# Patient Record
Sex: FEMALE | Race: WHITE | NOT HISPANIC OR LATINO | URBAN - NONMETROPOLITAN AREA
[De-identification: names, ages, dates, MRNs, and addresses within clinical notes are randomized per-mention and may not be internally consistent; named-entity substitution may affect disease eponyms.]

---

## 2017-05-15 PROBLEM — Z96.1: Noted: 2017-02-13

## 2017-05-15 PROBLEM — H52.4: Noted: 2019-02-18

## 2017-05-15 PROBLEM — H40.1131: Noted: 2017-05-15

## 2017-05-15 PROBLEM — H43.813: Noted: 2017-05-15

## 2019-02-18 ENCOUNTER — IMPORTED ENCOUNTER (OUTPATIENT)
Dept: URBAN - NONMETROPOLITAN AREA CLINIC 1 | Facility: CLINIC | Age: 84
End: 2019-02-18

## 2019-02-18 PROCEDURE — 99214 OFFICE O/P EST MOD 30 MIN: CPT

## 2019-02-18 PROCEDURE — 92250 FUNDUS PHOTOGRAPHY W/I&R: CPT

## 2019-02-18 NOTE — PATIENT DISCUSSION
POAG OUDiscussed diagnosis with patient. 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 Importance of good compliance with medications was emphasize. Optos performed today; stable OU. IOP stable at 13 OD 14 OSCup to Dic noted at 0.75 OD 0.8 OS Continue Latanoprost QHS OU. Continue to monitor. RTC in 3 months with VF 24-2PVD OU Discussed findings of exam in detail with the patient. The risk of retinal detachment in patients with PVDs was discussed with the patient and the warning signs of retinal detachment were carefully reviewed with the patient. The patient was warned to return to the office or contact the ophthalmologist on call immediately if they experience signs of retinal detachment. Continue to monitor. Pseudophakia OUDiscussed diagnosis in detail with patient. Both intraocular lenses in place today and stable. Conitnue to monitor. Presbyopia OUDiscussed refractive status in detail with patient. New glasses Rx given today. Continue to monitor.; 's Notes: MR 7/1/16DFE 8/20/18OCT 8/20/18Gonio 11/19/18VF 5/21/18Optos 2/18/19

## 2019-06-28 ENCOUNTER — IMPORTED ENCOUNTER (OUTPATIENT)
Dept: URBAN - NONMETROPOLITAN AREA CLINIC 1 | Facility: CLINIC | Age: 84
End: 2019-06-28

## 2019-06-28 PROCEDURE — 99214 OFFICE O/P EST MOD 30 MIN: CPT

## 2019-06-28 PROCEDURE — 92083 EXTENDED VISUAL FIELD XM: CPT

## 2019-06-28 NOTE — PATIENT DISCUSSION
POAG OUDiscussed diagnosis with patient. 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 Importance of good compliance with medications was emphasize. VF done today; rim artifact OD and Superior/inferior arcuate OS. IOP stable at 12 OU. Cup to Dic noted at 0.75 OD 0.8 OS Continue Latanoprost QHS OU. Continue to monitor. RTC in 3 months with OCTPVD OU Discussed findings of exam in detail with the patient. The risk of retinal detachment in patients with PVDs was discussed with the patient and the warning signs of retinal detachment were carefully reviewed with the patient. The patient was warned to return to the office or contact the ophthalmologist on call immediately if they experience signs of retinal detachment. Continue to monitor. Pseudophakia OUDiscussed diagnosis in detail with patient. Both intraocular lenses in place today and stable. Conitnue to monitor. Presbyopia OUDiscussed refractive status in detail with patient. Continue to monitor.; 's Notes: MR 2/18/19DFE 6/28/19OCT 8/20/18Gonio 11/19/18VF 6/28/19Optos 2/18/19

## 2019-10-11 ENCOUNTER — IMPORTED ENCOUNTER (OUTPATIENT)
Dept: URBAN - NONMETROPOLITAN AREA CLINIC 1 | Facility: CLINIC | Age: 84
End: 2019-10-11

## 2019-10-11 PROCEDURE — 99214 OFFICE O/P EST MOD 30 MIN: CPT

## 2019-10-11 PROCEDURE — 92133 CPTRZD OPH DX IMG PST SGM ON: CPT

## 2019-10-11 NOTE — PATIENT DISCUSSION
POAG OUDiscussed diagnosis with patient. 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 Importance of good compliance with medications was emphasize. VF done previously; rim artifact OD and Superior/inferior arcuate OS. IOP stable at 14 OU Cup to Dic noted at 0.75 OD 0.8 OS Continue Latanoprost QHS OU. OCT done today shows NFL OD and Superior Inferior and Temporal thinning OS stable from previous Continue to monitor. RTC in 3 months with OPTOS PVD OU Discussed findings of exam in detail with the patient. The risk of retinal detachment in patients with PVDs was discussed with the patient and the warning signs of retinal detachment were carefully reviewed with the patient. The patient was warned to return to the office or contact the ophthalmologist on call immediately if they experience signs of retinal detachment. Continue to monitor. Pseudophakia OUDiscussed diagnosis in detail with patient. Both intraocular lenses in place today and stable. Conitnue to monitor. Presbyopia OUDiscussed refractive status in detail with patient. Continue to monitor.; 's Notes: MR 2/18/19DFE 6/28/19OCT 10/11/19 Gonio 11/19/18VF 6/28/19Optos 2/18/19

## 2019-10-24 ENCOUNTER — IMPORTED ENCOUNTER (OUTPATIENT)
Dept: URBAN - NONMETROPOLITAN AREA CLINIC 1 | Facility: CLINIC | Age: 84
End: 2019-10-24

## 2019-10-24 PROCEDURE — 99214 OFFICE O/P EST MOD 30 MIN: CPT

## 2019-10-24 PROCEDURE — 92250 FUNDUS PHOTOGRAPHY W/I&R: CPT

## 2019-10-24 PROCEDURE — 92020 GONIOSCOPY: CPT

## 2019-10-24 NOTE — PATIENT DISCUSSION
POAG OU-  Discussed diagnosis with patient. -  Discussed the chronic progressive nature of this disease and various treatment options. -  Importance of good compliance with medications was emphasize patient reports compliance with Latanoprost OU last night.-  Patient recently started Lasix ?mg QD started last week (10/16/19) and patient started noticing decreased VA OD on Saturday (10/19/19) as well as slight fever. She was also on an antibiotic during this time due to UTI started on Macrobid. -  IOP done by tech today 38 OD 52 OS-  IOP checked by me today at 28 OD 46 OS-  1 drop of Betimol 0.5% instilled in office at 4:11pm again at 4:23pm. -  IOP checked again at 4:40pm at 26 OD and 42 OS instilled another drop of Betimolol 0.5% at 4:41pm. -  IOP checked again at 5:11pm at 32 OD and 46 OS instilled another drop of Betimol 0.5% at 5:12pm. -  IOP checked again at 5:38pm at OD 18 and OS 42. -  IOP checked again at 5:47pm at OS 36.-  Gonio done today grade IV with light pigment OD and grade II with darker pigment OS. (OS worse than prior of grade IV). -  Ordered Optos and obtained today:  Appears stable OU.-  Patient denies any pain over the last two weeks in either eye. -  VF done previously; rim artifact OD and Superior/inferior arcuate OS.-  Cup to Dic noted at 0.75 OD 0.8 OS (stable OU)-  No hypopion or vitreous cells noted-  Continue Latanoprost OU QHS refills sent-  Start Azopt (Dorzolamide) OS TID Rx sent -  Start Lotemax SM OS BID sample given-  Start Combigan OS BID sample given-  Start Besivance OS QID sample given-  OCT done last visit: shows NFL OD and Superior Inferior and Temporal thinning OS stable from previous -  Continue to monitor.---------------------------------------------------------------PVD OU Discussed findings of exam in detail with the patient. The risk of retinal detachment in patients with PVDs was discussed with the patient and the warning signs of retinal detachment were carefully reviewed with the patient. The patient was warned to return to the office or contact the ophthalmologist on call immediately if they experience signs of retinal detachment. Continue to monitor. Pseudophakia OUDiscussed diagnosis in detail with patient. Both intraocular lenses in place today and stable. Conitnue to monitor. Presbyopia OUDiscussed refractive status in detail with patient. Continue to monitor.; 's Notes: MR 2/18/19DFE 6/28/19OCT 10/11/19 Gonio  10/24/19 (OD gIV OS gII)VF 6/28/19Optos 10/24/19

## 2019-10-25 ENCOUNTER — IMPORTED ENCOUNTER (OUTPATIENT)
Dept: URBAN - NONMETROPOLITAN AREA CLINIC 1 | Facility: CLINIC | Age: 84
End: 2019-10-25

## 2019-10-25 PROCEDURE — 99213 OFFICE O/P EST LOW 20 MIN: CPT

## 2019-10-25 NOTE — PATIENT DISCUSSION
POAG OU w/ Excessive IOP spike last 24 hour (improved today)-  Discussed diagnosis with patient. -  Discussed the chronic progressive nature of this disease and various treatment options. -  Importance of good compliance with medications was emphasize patient reports compliance with Latanoprost OU last night.-  Patient recently started Lasix ?mg QD started last week (10/16/19) and patient started noticing decreased VA OD on Saturday (10/19/19) as well as slight fever. She was also on an antibiotic during this time due to UTI started on Macrobid. -  IOP done by tech today 38 OD 52 OS 10/24/19-  IOP checked by me today at 28 OD 46 OS 10/24/19-  1 drop of Betimol 0.5% instilled in office at 4:11pm again at 4:23pm. -  IOP checked again at 4:40pm at 26 OD and 42 OS instilled another drop of Betimolol 0.5% at 4:41pm.  10/24/19-  IOP checked again at 5:11pm at 32 OD and 46 OS instilled another drop of Betimol 0.5% at 5:12pm. 10/24/19-  IOP checked again at 5:38pm at OD 18 and OS 42. 10/24/19-  IOP checked again at 5:47pm at OS 36. 10/24/19-IOP much better today OD @ 21 and OS 26 -  Gonio done 10/24/19  grade IV with light pigment OD and grade II with      darker pigment OS. (OS worse than prior of grade IV). Gonio done again today   OS  only improved Grade III with light pigment. -  Ordered Optos and obtained at last exam :  Appears stable OU.-  Patient denies any pain over the last two weeks in either eye. -  VF done previously; rim artifact OD and Superior/inferior arcuate OS.-  Cup to Dic noted at 0.75 OD 0.8 OS (stable OU)-  No hypopion or vitreous cells noted-  Continue Latanoprost OU QHS refills sent yesterday 10/24/19-  Continue Azopt (Dorzolamide) OU BID Rx sent 10/24/19 -  Continue Lotemax SM OS BID sample given 10/24/19-  Continue Combigan OS QD sample given 10/24/19 (pt experiencing burning)-  Continue Besivance OS QID sample given 10/24/19-  OCT done last visit: shows NFL OD and Superior Inferior and Temporal thinning OS stable from previous -Will have patient come in on Tuesday to see me and check IOP again will discuss patient hx with Dr. Sofie Trammell and have her see him on Wednesday unless he states otherwise. Will schedule appt for her on Wed with him and let her know if he does not need to see her. -  Continue to monitor.---------------------------------------------------------------PVD OU Discussed findings of exam in detail with the patient. The risk of retinal detachment in patients with PVDs was discussed with the patient and the warning signs of retinal detachment were carefully reviewed with the patient. The patient was warned to return to the office or contact the ophthalmologist on call immediately if they experience signs of retinal detachment. Continue to monitor. Pseudophakia OUDiscussed diagnosis in detail with patient. Both intraocular lenses in place today and stable. Conitnue to monitor. Presbyopia OUDiscussed refractive status in detail with patient. Continue to monitor.; Dr's Notes: MR 2/18/19DFE 6/28/19OCT 10/11/19 Gonio  10/24/19 (OD gIV OS gII)VF 6/28/19Optos 10/24/19

## 2019-10-29 ENCOUNTER — IMPORTED ENCOUNTER (OUTPATIENT)
Dept: URBAN - NONMETROPOLITAN AREA CLINIC 1 | Facility: CLINIC | Age: 84
End: 2019-10-29

## 2019-10-29 PROCEDURE — 99213 OFFICE O/P EST LOW 20 MIN: CPT

## 2019-10-29 NOTE — PATIENT DISCUSSION
POAG OU w/ Excessive IOP spike last 24 hour (improved today)-  Discussed diagnosis with patient. -  Discussed the chronic progressive nature of this disease and various treatment options. -  Importance of good compliance with medications was emphasize patient reports compliance with Latanoprost OU QHS Dorzolamide OU BID Combigan OS QD Lotemax SM OS BID Besivance OS QID and Restasis OU BID. -  IOP today was much better at 18 OD and 16 OS. -  Continue all medications as noted above other than decreasing Lotemax to QD and continue Besivance until gone. -  RTC tomorrow as scheduled with Dr. Lezlie Siemens pt agreed with plan. Notes below are from last two visits for [place] to review:  --------------------------------------------------------------------  Patient recently started Lasix ?mg QD started last week (10/16/19) and patient started noticing decreased VA OD on Saturday (10/19/19) as well as slight fever. She was also on an antibiotic during this time due to UTI started on Macrobid. -  IOP done by tech 10/24/19 38 OD 52 OS-  IOP checked by me 10/24/19 at 28 OD 46 OS -  1 drop of Betimol 0.5% instilled in office at 4:11pm again at 4:23pm. -  IOP checked again 10/24/19 at 4:40pm at 26 OD and 42 OS instilled another drop of Betimolol 0.5% at 4:41pm.  -  IOP checked again 10/24/19 at 5:11pm at 32 OD and 46 OS instilled another drop of Betimol 0.5% at 5:12pm. -  IOP checked again 10/24/19 at 5:38pm at OD 18 and OS 42. -  IOP checked again 10/24/19 at 5:47pm at OS 36. -  IOP much better 10/25/19 at 21 OD and 26 OS (improved from yesterday 10/24/19). -  Gonio done 10/24/19  grade IV with light pigment OD and grade II with      darker pigment OS. (OS worse than prior of grade IV). Gonio done again today   OS  only improved Grade III with light pigment. -  Ordered Optos 10/24/19:  Appears stable OU.-  Patient denies any pain over the last two weeks in either eye. -  VF done previously; rim artifact OD and Superior/inferior arcuate OS.-  Cup to Dic noted at 0.75 OD 0.8 OS (stable OU)-  No hypopion or vitreous cells noted 10/24/19 or 10/25/19-  Continue Latanoprost OU QHS refills sent yesterday 10/24/19-  Continue Azopt (Dorzolamide) OU BID Rx sent 10/24/19 -  Continue Lotemax SM OS BID sample given 10/24/19-  Continue Combigan OS QD sample given 10/24/19 (pt experiencing burning)-  Continue Besivance OS QID sample given 10/24/19-  OCT done last visit: shows NFL OD and Superior Inferior and Temporal thinning OS stable from previous -Will have patient come in on Tuesday to see me and check IOP again will discuss patient hx with Dr. Lezlie Siemens and have her see him on Wednesday unless he states otherwise. Will schedule appt for her on Wed with him and let her know if he does not need to see her.  -  Continue to monitor.; 's Notes: MR 2/18/19DFE 6/28/19OCT 10/11/19 Gonio  10/24/19 (OD gIV OS gII)VF 6/28/19Optos 10/24/19

## 2019-10-30 ENCOUNTER — IMPORTED ENCOUNTER (OUTPATIENT)
Dept: URBAN - NONMETROPOLITAN AREA CLINIC 1 | Facility: CLINIC | Age: 84
End: 2019-10-30

## 2019-10-30 PROBLEM — Z96.1: Noted: 2019-10-30

## 2019-10-30 PROBLEM — H40.1131: Noted: 2019-10-30

## 2019-10-30 PROBLEM — H43.813: Noted: 2019-10-30

## 2019-10-30 PROBLEM — H52.4: Noted: 2019-10-30

## 2019-10-30 PROBLEM — H40.1431: Noted: 2019-10-30

## 2019-10-30 PROCEDURE — 92133 CPTRZD OPH DX IMG PST SGM ON: CPT

## 2019-10-30 PROCEDURE — 92012 INTRM OPH EXAM EST PATIENT: CPT

## 2019-10-30 NOTE — PATIENT DISCUSSION
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 -  Discussed the chronic progressive nature of this disease and various treatment    options. -  Importance of good compliance with medications was emphasize patient    reports compliance with Latanoprost OU QHS Dorzolamide OU BID Combigan   OS QD. Continue as directed. -  D/C Lotemax SM -  D/C Besivance -  Continue Restasis BID -  IOP today was much better at 12 OD and 14 OS -  OCT ON done today and reviewed with patient: No RNFL thinning OD and   superior/nasal/inferior thinning OS. (poor scan OS) -  Continue all medications as noted above-  Follow up with Dr. Karl Andino in 3 monthsNotes below are from previous exam --------------------------------------------------------------------  Patient recently started Lasix ?mg QD started last week (10/16/19) and patient started noticing decreased VA OD on Saturday (10/19/19) as well as slight fever. She was also on an antibiotic during this time due to UTI started on Macrobid. -  IOP done by tech 10/24/19 38 OD 52 OS-  IOP checked by me 10/24/19 at 28 OD 46 OS -  1 drop of Betimol 0.5% instilled in office at 4:11pm again at 4:23pm. -  IOP checked again 10/24/19 at 4:40pm at 26 OD and 42 OS instilled another drop of Betimolol 0.5% at 4:41pm.  -  IOP checked again 10/24/19 at 5:11pm at 32 OD and 46 OS instilled another drop of Betimol 0.5% at 5:12pm. -  IOP checked again 10/24/19 at 5:38pm at OD 18 and OS 42. -  IOP checked again 10/24/19 at 5:47pm at OS 36. -  IOP much better 10/25/19 at 21 OD and 26 OS (improved from yesterday 10/24/19). -  Gonio done 10/24/19  grade IV with light pigment OD and grade II with      darker pigment OS. (OS worse than prior of grade IV). Gonio done again today   OS  only improved Grade III with light pigment. -  Ordered Optos 10/24/19:  Appears stable OU.-  Patient denies any pain over the last two weeks in either eye. -  VF done previously; rim artifact OD and Superior/inferior arcuate OS.-  Cup to Dic noted at 0.75 OD 0.8 OS (stable OU)-  No hypopion or vitreous cells noted 10/24/19 or 10/25/19-  Continue Latanoprost OU QHS refills sent yesterday 10/24/19-  Continue Azopt (Dorzolamide) OU BID Rx sent 10/24/19 -  Continue Lotemax SM OS BID sample given 10/24/19-  Continue Combigan OS QD sample given 10/24/19 (pt experiencing burning)-  Continue Besivance OS QID sample given 10/24/19-  OCT done last visit: shows NFL OD and Superior Inferior and Temporal thinning OS stable from previous -Will have patient come in on Tuesday to see me and check IOP again will discuss patient hx with Dr. Shy Bobby and have her see him on Wednesday unless he states otherwise. Will schedule appt for her on Wed with him and let her know if he does not need to see her.  -  Continue to monitor.; 's Notes: MR 2/18/19DFE 6/28/19OCT 10/11/19 Gonio  10/24/19 (OD gIV OS gII)VF 6/28/19Optos 10/24/19

## 2019-11-18 ENCOUNTER — IMPORTED ENCOUNTER (OUTPATIENT)
Dept: URBAN - NONMETROPOLITAN AREA CLINIC 1 | Facility: CLINIC | Age: 84
End: 2019-11-18

## 2019-11-18 PROBLEM — H40.1131: Noted: 2019-11-18

## 2019-11-18 PROBLEM — Z96.1: Noted: 2019-11-18

## 2019-11-18 PROBLEM — H43.813: Noted: 2019-11-18

## 2019-11-18 PROBLEM — B00.52: Noted: 2019-11-18

## 2019-11-18 PROBLEM — H40.1431: Noted: 2019-11-18

## 2019-11-18 PROCEDURE — 92012 INTRM OPH EXAM EST PATIENT: CPT

## 2019-11-18 NOTE — PATIENT DISCUSSION
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 Star Zirgan 5X OS Rx sent to pharmacy. Start Valtex 1 gram TID Rx sent to pharmacy. Continue to monitor. 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 Importance of good compliance with medications was emphasize patient reports compliance with Latanoprost OU QHS Dorzolamide OU BID Combigan OS QD. Continue as directed. Continue Restasis BID IOP at 14 OU. Continue all medications as noted aboveContinue to monitor. PVD OUDiscussed findings of exam in detail with the patient. The risk of retinal detachment in patients with PVDs was discussed with the patient and the warning signs of retinal detachment were carefully reviewed with the patient. The patient was warned to return to the office or contact the ophthalmologist on call immediately if they experience signs of retinal detachment. Continue to monitor. P/C IOL OUDiscussed diagnosis in detail with patient. Both intraocular implants in place and stable. Continue to monitor.; 's Notes: MR 2/18/19DFE 6/28/19OCT 10/11/19 Gonio  10/24/19 (OD gIV OS gII)VF 6/28/19Optos 10/24/19

## 2019-11-22 ENCOUNTER — IMPORTED ENCOUNTER (OUTPATIENT)
Dept: URBAN - NONMETROPOLITAN AREA CLINIC 1 | Facility: CLINIC | Age: 84
End: 2019-11-22

## 2019-11-22 PROCEDURE — 92012 INTRM OPH EXAM EST PATIENT: CPT

## 2019-11-22 NOTE — PATIENT DISCUSSION
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 Minimizing dendrite OSContniue Zirgan 5X OS x 1 week. Continue Valtex 1 gram TID. Continue to monitor. 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 Importance of good compliance with medications was emphasize patient reports compliance with Latanoprost OU QHS Dorzolamide OU BID Combigan OS QD. Continue as directed. Continue Restasis BID IOP at 14 OU. Continue all medications as noted aboveContinue to monitor. PVD OUDiscussed findings of exam in detail with the patient. The risk of retinal detachment in patients with PVDs was discussed with the patient and the warning signs of retinal detachment were carefully reviewed with the patient. The patient was warned to return to the office or contact the ophthalmologist on call immediately if they experience signs of retinal detachment. Continue to monitor. P/C IOL OUDiscussed diagnosis in detail with patient. Both intraocular implants in place and stable. Continue to monitor.; 's Notes: MR 2/18/19DFE 6/28/19OCT 10/11/19 Gonio  10/24/19 (OD gIV OS gII)VF 6/28/19Optos 10/24/19

## 2019-12-02 ENCOUNTER — IMPORTED ENCOUNTER (OUTPATIENT)
Dept: URBAN - NONMETROPOLITAN AREA CLINIC 1 | Facility: CLINIC | Age: 84
End: 2019-12-02

## 2019-12-02 PROCEDURE — 92012 INTRM OPH EXAM EST PATIENT: CPT

## 2019-12-16 ENCOUNTER — IMPORTED ENCOUNTER (OUTPATIENT)
Dept: URBAN - NONMETROPOLITAN AREA CLINIC 1 | Facility: CLINIC | Age: 84
End: 2019-12-16

## 2019-12-16 PROCEDURE — 92012 INTRM OPH EXAM EST PATIENT: CPT

## 2019-12-16 NOTE — PATIENT DISCUSSION
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 No dendrite present today OSD/C Zirgan BID OS. Continue to monitor. 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 Importance of good compliance with medications was emphasize. Continue Latanoprost QHS OU and Combigan QD OS. Continue Restasis BID IOP at 14 OU. Continue all medications as noted aboveContinue to monitor. RTC A/S for follow up with Gonio PVD OUDiscussed findings of exam in detail with the patient. The risk of retinal detachment in patients with PVDs was discussed with the patient and the warning signs of retinal detachment were carefully reviewed with the patient. The patient was warned to return to the office or contact the ophthalmologist on call immediately if they experience signs of retinal detachment. Continue to monitor. P/C IOL OUDiscussed diagnosis in detail with patient. Both intraocular implants in place and stable. Continue to monitor.; 's Notes: MR 2/18/19DFE 6/28/19OCT 10/11/19 Gonio  10/24/19 (OD gIV OS gII)VF 6/28/19Optos 10/24/19

## 2020-01-17 ENCOUNTER — IMPORTED ENCOUNTER (OUTPATIENT)
Dept: URBAN - NONMETROPOLITAN AREA CLINIC 1 | Facility: CLINIC | Age: 85
End: 2020-01-17

## 2020-01-17 PROBLEM — Z96.1: Noted: 2020-01-17

## 2020-01-17 PROBLEM — H40.1431: Noted: 2020-01-17

## 2020-01-17 PROBLEM — H40.1131: Noted: 2020-01-17

## 2020-01-17 PROBLEM — H43.813: Noted: 2020-01-17

## 2020-01-17 PROCEDURE — 92020 GONIOSCOPY: CPT

## 2020-01-17 PROCEDURE — 99214 OFFICE O/P EST MOD 30 MIN: CPT

## 2020-01-17 NOTE — PATIENT DISCUSSION
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 Importance of good compliance with medications was emphasize. Gonio done today; Grade IV with light pigment OU. IOP at 14 OD and 15 OS. Cup to disc noted at 0.75 OD and 0.8 OS. Continue Restasis BIDContinue Latanoprost QHS OU and Combigan QD OS. Rx sent to pharmacy. Continue to monitor. RTC 3 months with Optos. PVD OUDiscussed findings of exam in detail with the patient. The risk of retinal detachment in patients with PVDs was discussed with the patient and the warning signs of retinal detachment were carefully reviewed with the patient. The patient was warned to return to the office or contact the ophthalmologist on call immediately if they experience signs of retinal detachment. Continue to monitor. P/C IOL OUDiscussed diagnosis in detail with patient. Both intraocular implants in place and stable. Continue to monitor.; 's Notes: MR 2/18/19DFE 6/28/19OCT 10/11/19 Gonio  1/17/20VF 6/28/19Optos 10/24/19

## 2020-04-22 ENCOUNTER — IMPORTED ENCOUNTER (OUTPATIENT)
Dept: URBAN - NONMETROPOLITAN AREA CLINIC 1 | Facility: CLINIC | Age: 85
End: 2020-04-22

## 2020-04-22 PROCEDURE — 92012 INTRM OPH EXAM EST PATIENT: CPT

## 2020-04-22 PROCEDURE — 92250 FUNDUS PHOTOGRAPHY W/I&R: CPT

## 2020-04-22 NOTE — PATIENT DISCUSSION
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 Importance of good compliance with medications was emphasize. Gonio done today; Grade IV with light pigment OU. IOP stable at 13 OD and 14 OSCup to disc noted at 0.75 OD and 0.8 OS. Continue Restasis BIDContinue Latanoprost QHS OU and Combigan QD OS. Rx sent to pharmacy. Continue to monitor. RTC 3 months with OCTPVD OUDiscussed findings of exam in detail with the patient. The risk of retinal detachment in patients with PVDs was discussed with the patient and the warning signs of retinal detachment were carefully reviewed with the patient. The patient was warned to return to the office or contact the ophthalmologist on call immediately if they experience signs of retinal detachment. Continue to monitor. P/C IOL OUDiscussed diagnosis in detail with patient. Both intraocular implants in place and stable. Continue to monitor.; 's Notes: MR 2/18/19DFE 6/28/19OCT 10/11/19 Gonio  1/17/20VF 6/28/19Optos 4/22/2020

## 2020-05-06 NOTE — PATIENT DISCUSSION
Glasses Prescription given to patient for reading.  She prefers OTC if possible (no 3.50 available).   Computer - try OTC 2.00 and 2.50.

## 2020-07-24 ENCOUNTER — IMPORTED ENCOUNTER (OUTPATIENT)
Dept: URBAN - NONMETROPOLITAN AREA CLINIC 1 | Facility: CLINIC | Age: 85
End: 2020-07-24

## 2020-07-24 PROBLEM — H43.813: Noted: 2020-01-17

## 2020-07-24 PROBLEM — H52.4: Noted: 2020-07-24

## 2020-07-24 PROBLEM — H40.1431: Noted: 2020-01-17

## 2020-07-24 PROBLEM — Z96.1: Noted: 2020-01-17

## 2020-07-24 PROBLEM — H40.1131: Noted: 2020-01-17

## 2020-07-24 PROCEDURE — 92133 CPTRZD OPH DX IMG PST SGM ON: CPT

## 2020-07-24 PROCEDURE — 92014 COMPRE OPH EXAM EST PT 1/>: CPT

## 2020-07-24 NOTE — PATIENT DISCUSSION
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 Importance of good compliance with medications was emphasize. OCT done today; no NFL thinning OD and superior/inferior NFL thinning OS. IOP stable at 13 OD and 12 OSCup to disc noted at 0.75 OD and 0.8 OS. Continue Restasis BIDContinue Latanoprost QHS OU and Combigan QD OS. Continue to monitor. RTC 3 months with VF 24-2PVD OUDiscussed findings of exam in detail with the patient. The risk of retinal detachment in patients with PVDs was discussed with the patient and the warning signs of retinal detachment were carefully reviewed with the patient. The patient was warned to return to the office or contact the ophthalmologist on call immediately if they experience signs of retinal detachment. Continue to monitor. P/C IOL OUDiscussed diagnosis in detail with patient. Both intraocular implants in place and stable. Continue to monitor. Presbyopia OUDiscussed refractive status in detail with patient. New glasses Rx given today. Continue to monitor.; 's Notes: MR  7/24/20DFE  7/24/20OCT 7/24/20 Gonio  1/17/20VF 6/28/19Optos 4/22/2020

## 2020-07-30 ENCOUNTER — IMPORTED ENCOUNTER (OUTPATIENT)
Dept: URBAN - NONMETROPOLITAN AREA CLINIC 1 | Facility: CLINIC | Age: 85
End: 2020-07-30

## 2020-07-30 PROBLEM — H40.1431: Noted: 2020-07-30

## 2020-07-30 PROBLEM — H10.12: Noted: 2020-07-30

## 2020-07-30 PROBLEM — Z96.1: Noted: 2020-07-30

## 2020-07-30 PROBLEM — H43.813: Noted: 2020-07-30

## 2020-07-30 PROBLEM — H40.1131: Noted: 2020-07-30

## 2020-07-30 PROBLEM — H52.4: Noted: 2020-07-30

## 2020-07-30 PROCEDURE — 99213 OFFICE O/P EST LOW 20 MIN: CPT

## 2020-07-30 NOTE — PATIENT DISCUSSION
Conjunctivitis OS- Discussed diagnosis in detail with patient - Injection and chemosis noted today - Start Besivance BID OS sample given today - Start Prolensa QD OS sample given today- Continue to monitor - RTC on Monday with Dr. 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 Importance of good compliance with medications was emphasize. OCT done today; no NFL thinning OD and superior/inferior NFL thinning OS. IOP stable at 13 OD and 12 OSCup to disc noted at 0.75 OD and 0.8 OS. Continue Restasis BIDContinue Latanoprost QHS OU and Combigan QD OS. Continue to monitor. RTC 3 months with VF 24-2PVD OUDiscussed findings of exam in detail with the patient. The risk of retinal detachment in patients with PVDs was discussed with the patient and the warning signs of retinal detachment were carefully reviewed with the patient. The patient was warned to return to the office or contact the ophthalmologist on call immediately if they experience signs of retinal detachment. Continue to monitor. P/C IOL OUDiscussed diagnosis in detail with patient. Both intraocular implants in place and stable. Continue to monitor. Presbyopia OUDiscussed refractive status in detail with patient. New glasses Rx given today. Continue to monitor.; 's Notes: MR  7/24/20<br />DFE  7/24/20<br />OCT 7/24/20 <br />Gonio  1/17/20<br />VF 6/28/19<br />Optos 4/22/2020<br />

## 2020-08-10 ENCOUNTER — IMPORTED ENCOUNTER (OUTPATIENT)
Dept: URBAN - NONMETROPOLITAN AREA CLINIC 1 | Facility: CLINIC | Age: 85
End: 2020-08-10

## 2020-08-10 PROCEDURE — 92012 INTRM OPH EXAM EST PATIENT: CPT

## 2020-08-10 NOTE — PATIENT DISCUSSION
Conjunctivitis OS- Discussed diagnosis in detail with patient - Resolved- Continue Besivance BID OS until sample is empty. 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 Importance of good compliance with medications was emphasize. OCT done today; no NFL thinning OD and superior/inferior NFL thinning OS. IOP stable at 13 OD and 12 OSCup to disc noted at 0.75 OD and 0.8 OS. Continue Restasis BIDContinue Latanoprost QHS OU and Combigan QD OS. Continue to monitor. RTC 3 months with VF 24-2PVD OUDiscussed findings of exam in detail with the patient. The risk of retinal detachment in patients with PVDs was discussed with the patient and the warning signs of retinal detachment were carefully reviewed with the patient. The patient was warned to return to the office or contact the ophthalmologist on call immediately if they experience signs of retinal detachment. Continue to monitor. P/C IOL OUDiscussed diagnosis in detail with patient. Both intraocular implants in place and stable. Continue to monitor. Presbyopia OUDiscussed refractive status in detail with patient. New glasses Rx given today. Continue to monitor.; 's Notes: MR  7/24/20DFE  7/24/20OCT 7/24/20 Gonio  1/17/20VF 6/28/19Optos 4/22/2020

## 2020-10-23 ENCOUNTER — IMPORTED ENCOUNTER (OUTPATIENT)
Dept: URBAN - NONMETROPOLITAN AREA CLINIC 1 | Facility: CLINIC | Age: 85
End: 2020-10-23

## 2020-10-23 PROBLEM — H40.1431: Noted: 2020-10-23

## 2020-10-23 PROBLEM — Z96.1: Noted: 2020-10-23

## 2020-10-23 PROBLEM — H52.4: Noted: 2020-10-23

## 2020-10-23 PROBLEM — H43.813: Noted: 2020-10-23

## 2020-10-23 PROBLEM — H40.1131: Noted: 2020-10-23

## 2020-10-23 PROCEDURE — 92083 EXTENDED VISUAL FIELD XM: CPT

## 2020-10-23 PROCEDURE — 99214 OFFICE O/P EST MOD 30 MIN: CPT

## 2020-10-23 NOTE — PATIENT DISCUSSION
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 Importance of good compliance with medications was emphasize. VF done today; Superior nasal defect OD and total defect OS. IOP stable at 14 OU. Cup to disc noted at 0.75 OD and 0.8 OS. Continue Restasis BIDContinue Latanoprost QHS OU and Combigan QD OS. Continue to monitor. RTC 3 months with Gonio PVD OUDiscussed findings of exam in detail with the patient. The risk of retinal detachment in patients with PVDs was discussed with the patient and the warning signs of retinal detachment were carefully reviewed with the patient. The patient was warned to return to the office or contact the ophthalmologist on call immediately if they experience signs of retinal detachment. Continue to monitor. P/C IOL OUDiscussed diagnosis in detail with patient. Both intraocular implants in place and stable. Continue to monitor. Presbyopia OUDiscussed refractive status in detail with patient. Continue to monitor.; 's Notes: MR  7/24/20DFE  7/24/20OCT 7/24/20 Gonio  1/17/20VF  10/23/20Optos 4/22/2020

## 2021-01-13 ENCOUNTER — IMPORTED ENCOUNTER (OUTPATIENT)
Dept: URBAN - NONMETROPOLITAN AREA CLINIC 1 | Facility: CLINIC | Age: 86
End: 2021-01-13

## 2021-01-13 PROBLEM — S05.02XA: Noted: 2021-01-13

## 2021-01-13 PROBLEM — H40.1431: Noted: 2021-01-13

## 2021-01-13 PROBLEM — Z96.1: Noted: 2021-01-13

## 2021-01-13 PROBLEM — H40.1131: Noted: 2021-01-13

## 2021-01-13 PROBLEM — H43.813: Noted: 2021-01-13

## 2021-01-13 PROBLEM — B00.52: Noted: 2021-01-13

## 2021-01-13 PROBLEM — H52.4: Noted: 2021-01-13

## 2021-01-13 PROCEDURE — 99213 OFFICE O/P EST LOW 20 MIN: CPT

## 2021-01-13 NOTE — PATIENT DISCUSSION
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 Importance of good compliance with medications was emphasize. VF done today; Superior nasal defect OD and total defect OS. IOP stable at 14 OU. Cup to disc noted at 0.75 OD and 0.8 OS. Continue Restasis BIDContinue Latanoprost QHS OU and Combigan QD OS. Continue to monitor. RTC 3 months with Kwesiio PVD OUDiscussed findings of exam in detail with the patient. The risk of retinal detachment in patients with PVDs was discussed with the patient and the warning signs of retinal detachment were carefully reviewed with the patient. The patient was warned to return to the office or contact the ophthalmologist on call immediately if they experience signs of retinal detachment. Continue to monitor. P/C IOL OUDiscussed diagnosis in detail with patient. Both intraocular implants in place and stable. Continue to monitor. Presbyopia OUDiscussed refractive status in detail with patient. Continue to monitor.; 's Notes: MR  7/24/20DFE  7/24/20OCT 7/24/20 Gonio  1/17/20VF  10/23/20Optos 4/22/2020

## 2021-01-22 ENCOUNTER — IMPORTED ENCOUNTER (OUTPATIENT)
Dept: URBAN - NONMETROPOLITAN AREA CLINIC 1 | Facility: CLINIC | Age: 86
End: 2021-01-22

## 2021-01-22 PROBLEM — H40.1431: Noted: 2021-01-22

## 2021-01-22 PROBLEM — B00.52: Noted: 2021-01-22

## 2021-01-22 PROBLEM — H43.813: Noted: 2021-01-22

## 2021-01-22 PROBLEM — H40.1131: Noted: 2021-01-22

## 2021-01-22 PROBLEM — H52.4: Noted: 2021-01-22

## 2021-01-22 PROBLEM — Z96.1: Noted: 2021-01-22

## 2021-01-22 PROCEDURE — 92020 GONIOSCOPY: CPT

## 2021-01-22 PROCEDURE — 99214 OFFICE O/P EST MOD 30 MIN: CPT

## 2021-01-22 NOTE — PATIENT DISCUSSION
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 Importance of good compliance with medications was emphasize. Gonio done today; Grade IV with light pigment OU. IOP stable at 15 OU. Cup to disc noted at 0.75 OD and 0.8 OS. Continue Restasis BID Rx sent to pharmacy. Continue Latanoprost QHS OU and Combigan QD OS. Continue to monitor. 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 Abrasion resolved with resolving dendrite. Finish Valtrex 1 gram TID x 10 days. Continue Trifluridine QID OS. Continue to monitor. RTC in 1-2 weeks for follow up PVD OUDiscussed findings of exam in detail with the patient. The risk of retinal detachment in patients with PVDs was discussed with the patient and the warning signs of retinal detachment were carefully reviewed with the patient. The patient was warned to return to the office or contact the ophthalmologist on call immediately if they experience signs of retinal detachment. Continue to monitor. P/C IOL OUDiscussed diagnosis in detail with patient. Both intraocular implants in place and stable. Continue to monitor. Presbyopia OUDiscussed refractive status in detail with patient. Continue to monitor.; 's Notes: MR  7/24/20DFE  7/24/20OCT 7/24/20 Gonio  1/22/21VF  10/23/20Optos 4/22/2020

## 2021-02-01 ENCOUNTER — IMPORTED ENCOUNTER (OUTPATIENT)
Dept: URBAN - NONMETROPOLITAN AREA CLINIC 1 | Facility: CLINIC | Age: 86
End: 2021-02-01

## 2021-02-01 PROBLEM — H52.4: Noted: 2021-01-22

## 2021-02-01 PROBLEM — B00.52: Noted: 2021-02-01

## 2021-02-01 PROBLEM — H40.1431: Noted: 2021-01-22

## 2021-02-01 PROBLEM — H43.813: Noted: 2021-01-22

## 2021-02-01 PROBLEM — H40.1131: Noted: 2021-02-01

## 2021-02-01 PROBLEM — Z96.1: Noted: 2021-01-22

## 2021-02-01 PROCEDURE — 92012 INTRM OPH EXAM EST PATIENT: CPT

## 2021-02-01 NOTE — PATIENT DISCUSSION
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 Now resolved. Patient finished Valtrex 1 gram TID x 10 days as directed. Continue Trifluridine BID OS X 1 week then D/C. Continue to monitor. 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 Importance of good compliance with medications was emphasize. IOP at 16 OD and 22 OS. Cup to disc noted at 0.75 OD and 0.8 OS. Continue Restasis BID. Continue Latanoprost QHS OU and Combigan QD OS. Continue to monitor. RTC in 3 months with Optos PVD OUDiscussed findings of exam in detail with the patient. The risk of retinal detachment in patients with PVDs was discussed with the patient and the warning signs of retinal detachment were carefully reviewed with the patient. The patient was warned to return to the office or contact the ophthalmologist on call immediately if they experience signs of retinal detachment. Continue to monitor. P/C IOL OUDiscussed diagnosis in detail with patient. Both intraocular implants in place and stable. Continue to monitor. Presbyopia OUDiscussed refractive status in detail with patient. Continue to monitor.; 's Notes: MR  7/24/20DFE  7/24/20OCT 7/24/20 Gonio  1/22/21VF  10/23/20Optos 4/22/2020

## 2021-05-07 ENCOUNTER — IMPORTED ENCOUNTER (OUTPATIENT)
Dept: URBAN - NONMETROPOLITAN AREA CLINIC 1 | Facility: CLINIC | Age: 86
End: 2021-05-07

## 2021-05-07 PROBLEM — Z96.1: Noted: 2021-01-22

## 2021-05-07 PROBLEM — H43.813: Noted: 2021-01-22

## 2021-05-07 PROBLEM — B00.52: Noted: 2021-05-07

## 2021-05-07 PROBLEM — H40.1431: Noted: 2021-01-22

## 2021-05-07 PROBLEM — H40.1131: Noted: 2021-05-07

## 2021-05-07 PROBLEM — H52.4: Noted: 2021-01-22

## 2021-05-07 PROCEDURE — 92250 FUNDUS PHOTOGRAPHY W/I&R: CPT

## 2021-05-07 PROCEDURE — 99214 OFFICE O/P EST MOD 30 MIN: CPT

## 2021-05-07 NOTE — PATIENT DISCUSSION
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 Importance of good compliance with medications was emphasize. Optos done today; stable from previous with glaucomatous cupping OU. IOP improved to 15 OD and 16 OS. Cup to disc noted at 0.75 OD and 0.8 OS. Continue Restasis BID. Continue Latanoprost QHS OU and Combigan QD OS. Continue to monitor. RTC in 3 months with OCTHx of HSV Keratitis OSDiscussed diagnosis in detail with patient. Now resolved. Continue to monitor. PVD OUDiscussed findings of exam in detail with the patient. The risk of retinal detachment in patients with PVDs was discussed with the patient and the warning signs of retinal detachment were carefully reviewed with the patient. The patient was warned to return to the office or contact the ophthalmologist on call immediately if they experience signs of retinal detachment. Continue to monitor. P/C IOL OUDiscussed diagnosis in detail with patient. Both intraocular implants in place and stable. Continue to monitor. Presbyopia OUDiscussed refractive status in detail with patient. Continue to monitor.; 's Notes: MR  7/24/20DFE  7/24/20OCT 7/24/20 Gonio  1/22/21VF  10/23/20Optos 5/7/21

## 2021-08-13 ENCOUNTER — IMPORTED ENCOUNTER (OUTPATIENT)
Dept: URBAN - NONMETROPOLITAN AREA CLINIC 1 | Facility: CLINIC | Age: 86
End: 2021-08-13

## 2021-08-13 PROCEDURE — 92015 DETERMINE REFRACTIVE STATE: CPT

## 2021-08-13 PROCEDURE — 99214 OFFICE O/P EST MOD 30 MIN: CPT

## 2021-08-13 PROCEDURE — 92133 CPTRZD OPH DX IMG PST SGM ON: CPT

## 2021-08-13 NOTE — PATIENT DISCUSSION
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 Importance of good compliance with medications was emphasize. OCT done today stable findings OU. OD shows good RNFL. OS shows inferior and superior RNFL thinning. Optos done previously; stable from previous with glaucomatous cupping OU. IOP improved to 13 OD and 15 OS. Cup to disc noted at 0.75 OD and 0.8 OS. Continue Latanoprost QHS OU and Combigan QD OS. Continue to monitor. RTC in 3 months with OCTHx of HSV Keratitis OSDiscussed diagnosis in detail with patient. Now resolved. Continue to monitor. BRANDO OUDiscussed findings w/ ptContinue Restasis BID. Monitor PRNPVD OUDiscussed findings of exam in detail with the patient. The risk of retinal detachment in patients with PVDs was discussed with the patient and the warning signs of retinal detachment were carefully reviewed with the patient. The patient was warned to return to the office or contact the ophthalmologist on call immediately if they experience signs of retinal detachment. Continue to monitor. P/C IOL OUDiscussed diagnosis in detail with patient. Both intraocular implants in place and stable. Continue to monitor. Presbyopia OUDiscussed refractive status in detail with patient. Continue to monitor.; 's Notes: MR  8/13/2021DFE  8/13/2021OCT  8/13/2021Gonio  1/22/21VF  10/23/20 ** no longer required due to age in unreliabilityOptos 5/7/21

## 2021-12-10 ENCOUNTER — IMPORTED ENCOUNTER (OUTPATIENT)
Dept: URBAN - NONMETROPOLITAN AREA CLINIC 1 | Facility: CLINIC | Age: 86
End: 2021-12-10

## 2021-12-10 PROCEDURE — 99214 OFFICE O/P EST MOD 30 MIN: CPT

## 2021-12-10 PROCEDURE — 92250 FUNDUS PHOTOGRAPHY W/I&R: CPT

## 2021-12-10 NOTE — PATIENT DISCUSSION
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 Importance of good compliance with medications was emphasize. OCT done previously stable findings OU. OD shows good RNFL. OS shows inferior and superior RNFL thinning. Optos done today; stable from previous with glaucomatous cupping OU. IOP stable at 14 OD and 15 OS. Cup to disc noted at 0.75 OD and 0.8 OS. Continue Latanoprost QHS OU and Combigan QD OS. Continue to monitor. RTC in 6 months with OCTHx of HSV Keratitis OSDiscussed diagnosis in detail with patient. Now resolved. Continue to monitor. BRANDO OUDiscussed findings w/ ptContinue Restasis BID. Monitor PRNPVD OUDiscussed findings of exam in detail with the patient. The risk of retinal detachment in patients with PVDs was discussed with the patient and the warning signs of retinal detachment were carefully reviewed with the patient. The patient was warned to return to the office or contact the ophthalmologist on call immediately if they experience signs of retinal detachment. Continue to monitor. P/C IOL OUDiscussed diagnosis in detail with patient. Both intraocular implants in place and stable. Continue to monitor. Presbyopia OUDiscussed refractive status in detail with patient. Continue to monitor.; 's Notes: MR  8/13/2021DFE  8/13/2021OCT  8/13/2021Gonio  1/22/21VF  10/23/20 ** no longer required due to age in unreliabilityOptos  12/10/21

## 2022-04-09 ASSESSMENT — PACHYMETRY
OS_CT_UM: 536; ADJ: THIN
OD_CT_UM: 550; ADJ: THIN
OS_CT_UM: 536; ADJ: THIN
OD_CT_UM: 550; ADJ: THIN
OD_CT_UM: 550; ADJ: THIN
OS_CT_UM: 536; ADJ: THIN
OD_CT_UM: 550; ADJ: THIN
OS_CT_UM: 536; ADJ: THIN
OD_CT_UM: 550; ADJ: THIN
OS_CT_UM: 536; ADJ: THIN
OD_CT_UM: 550; ADJ: THIN
OS_CT_UM: 536; ADJ: THIN
OD_CT_UM: 550; ADJ: THIN
OS_CT_UM: 536; ADJ: THIN
OD_CT_UM: 550; ADJ: THIN
OS_CT_UM: 536; ADJ: THIN
OS_CT_UM: 536; ADJ: THIN
OD_CT_UM: 550; ADJ: THIN
OS_CT_UM: 536; ADJ: THIN
OS_CT_UM: 536; ADJ: THIN
OD_CT_UM: 550; ADJ: THIN
OD_CT_UM: 550; ADJ: THIN
OS_CT_UM: 536; ADJ: THIN
OD_CT_UM: 550; ADJ: THIN
OS_CT_UM: 536; ADJ: THIN
OD_CT_UM: 550; ADJ: THIN
OS_CT_UM: 536; ADJ: THIN
OS_CT_UM: 536; ADJ: THIN
OD_CT_UM: 550; ADJ: THIN
OD_CT_UM: 550; ADJ: THIN
OS_CT_UM: 536; ADJ: THIN
OD_CT_UM: 550; ADJ: THIN
OS_CT_UM: 536; ADJ: THIN

## 2022-04-09 ASSESSMENT — VISUAL ACUITY
OD_SC: 20/22-
OD_SC: 20/22-
OS_SC: 20/30+1
OS_SC: 20/200-1
OS_SC: CF1'
OD_SC: 20/25-1
OS_SC: 20/200
OS_SC: 20/125
OD_SC: 20/29+1
OD_SC: 20/20-2
OS_SC: CF4'
OD_SC: 20/25-
OD_SC: 20/20
OD_SC: 20/20-
OS_SC: 20/400
OS_PH: 20/250
OS_SC: 20/200
OD_SC: 20/25
OS_SC: 20/32
OS_SC: 20/100
OD_SC: 20/20
OD_SC: 20/25
OD_SC: 20/20-
OS_SC: 20/80
OD_SC: 20/20-
OS_SC: 20/50-
OD_SC: 20/29
OS_SC: 20/60-1
OD_SC: 20/20-
OS_SC: 20/50-
OS_PH: 20/30-
OD_SC: 20/25+1
OD_SC: 20/20
OS_SC: 20/200
OD_SC: 20/32
OD_SC: 20/25-
OD_SC: 20/20
OS_SC: CF4'
OD_SC: 20/25-
OD_SC: 20/29-2
OD_SC: 20/30+3

## 2022-04-09 ASSESSMENT — TONOMETRY
OS_IOP_MMHG: 14
OS_IOP_MMHG: 16
OD_IOP_MMHG: 14
OD_IOP_MMHG: 13
OD_IOP_MMHG: 13
OD_IOP_MMHG: 18
OS_IOP_MMHG: 14
OS_IOP_MMHG: 12
OS_IOP_MMHG: 52
OS_IOP_MMHG: 14
OD_IOP_MMHG: 28
OS_IOP_MMHG: 16
OD_IOP_MMHG: 14
OD_IOP_MMHG: 12
OD_IOP_MMHG: 12
OS_IOP_MMHG: 46
OD_IOP_MMHG: 16
OD_IOP_MMHG: 21
OD_IOP_MMHG: 13
OS_IOP_MMHG: 15
OD_IOP_MMHG: 13
OS_IOP_MMHG: 15
OD_IOP_MMHG: 14
OS_IOP_MMHG: 14
OD_IOP_MMHG: 38
OS_IOP_MMHG: 16
OS_IOP_MMHG: 14
OD_IOP_MMHG: 15
OD_IOP_MMHG: 14
OD_IOP_MMHG: 13
OS_IOP_MMHG: 26
OS_IOP_MMHG: 15
OD_IOP_MMHG: 14
OS_IOP_MMHG: 15
OD_IOP_MMHG: 15
OS_IOP_MMHG: 15
OS_IOP_MMHG: 22
OD_IOP_MMHG: 12
OS_IOP_MMHG: 12
OD_IOP_MMHG: 13
OS_IOP_MMHG: 12
OS_IOP_MMHG: 14
OS_IOP_MMHG: 14
OD_IOP_MMHG: 12
OS_IOP_MMHG: 15

## 2022-06-10 ENCOUNTER — FOLLOW UP (OUTPATIENT)
Dept: URBAN - NONMETROPOLITAN AREA CLINIC 1 | Facility: CLINIC | Age: 87
End: 2022-06-10

## 2022-06-10 DIAGNOSIS — H40.1131: ICD-10-CM

## 2022-06-10 PROCEDURE — 99214 OFFICE O/P EST MOD 30 MIN: CPT

## 2022-06-10 PROCEDURE — 92133 CPTRZD OPH DX IMG PST SGM ON: CPT

## 2022-06-10 ASSESSMENT — TONOMETRY
OS_IOP_MMHG: 15
OD_IOP_MMHG: 15

## 2022-06-10 ASSESSMENT — VISUAL ACUITY: OD_CC: 20/25-2

## 2022-12-23 ENCOUNTER — FOLLOW UP (OUTPATIENT)
Dept: URBAN - NONMETROPOLITAN AREA CLINIC 1 | Facility: CLINIC | Age: 87
End: 2022-12-23

## 2022-12-23 PROCEDURE — 99214 OFFICE O/P EST MOD 30 MIN: CPT

## 2022-12-23 PROCEDURE — 92250 FUNDUS PHOTOGRAPHY W/I&R: CPT

## 2022-12-23 PROCEDURE — 92020 GONIOSCOPY: CPT

## 2022-12-23 ASSESSMENT — VISUAL ACUITY: OD_CC: 20/29

## 2022-12-23 ASSESSMENT — TONOMETRY
OS_IOP_MMHG: 16
OD_IOP_MMHG: 16

## 2023-06-23 ENCOUNTER — FOLLOW UP (OUTPATIENT)
Dept: URBAN - NONMETROPOLITAN AREA CLINIC 1 | Facility: CLINIC | Age: 88
End: 2023-06-23

## 2023-06-23 DIAGNOSIS — H40.1131: ICD-10-CM

## 2023-06-23 DIAGNOSIS — H43.813: ICD-10-CM

## 2023-06-23 PROCEDURE — 92133 CPTRZD OPH DX IMG PST SGM ON: CPT

## 2023-06-23 PROCEDURE — 99214 OFFICE O/P EST MOD 30 MIN: CPT

## 2023-06-23 ASSESSMENT — TONOMETRY
OS_IOP_MMHG: 20
OD_IOP_MMHG: 20
OS_IOP_MMHG: 21
OD_IOP_MMHG: 16

## 2023-06-23 ASSESSMENT — VISUAL ACUITY: OD_CC: 20/30-1

## 2023-08-01 NOTE — PATIENT DISCUSSION
----- Message from Mindy Borjas sent at 8/1/2023  9:11 AM CDT -----  NEEDS A SPORTS PHYSICAL PAPER FILLED OUT  MOM; VENANCIO  PHONE; [phone]     HSV Keratitis OSDiscussed diagnosis in detail with patient. No dendrite present today OSContinue Zirgan BID OS. Start Prolensa QD OS sample given today. Continue to monitor. 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 Importance of good compliance with medications was emphasize patient reports compliance with Latanoprost OU QHS Dorzolamide OU BID Combigan OS QD. Continue as directed. Continue Restasis BID IOP at 13 OD. Continue all medications as noted aboveContinue to monitor. PVD OUDiscussed findings of exam in detail with the patient. The risk of retinal detachment in patients with PVDs was discussed with the patient and the warning signs of retinal detachment were carefully reviewed with the patient. The patient was warned to return to the office or contact the ophthalmologist on call immediately if they experience signs of retinal detachment. Continue to monitor. P/C IOL OUDiscussed diagnosis in detail with patient. Both intraocular implants in place and stable. Continue to monitor.; 's Notes: MR 2/18/19DFE 6/28/19OCT 10/11/19 Gonio  10/24/19 (OD gIV OS gII)VF 6/28/19Optos 10/24/19